# Patient Record
Sex: FEMALE | Race: WHITE | NOT HISPANIC OR LATINO | URBAN - METROPOLITAN AREA
[De-identification: names, ages, dates, MRNs, and addresses within clinical notes are randomized per-mention and may not be internally consistent; named-entity substitution may affect disease eponyms.]

---

## 2017-04-28 ENCOUNTER — HOSPITAL ENCOUNTER (OUTPATIENT)
Dept: GENERAL RADIOLOGY | Facility: HOSPITAL | Age: 72
Discharge: HOME | End: 2017-04-28
Attending: INTERNAL MEDICINE | Admitting: INTERNAL MEDICINE

## 2017-04-28 ENCOUNTER — CONVERSION ENCOUNTER (OUTPATIENT)
Dept: RHEUMATOLOGY | Facility: CLINIC | Age: 72
End: 2017-04-28

## 2017-04-28 LAB
ALBUMIN SERPL-MCNC: 4.3 G/DL (ref 3.6–5.1)
ALBUMIN/GLOB SERPL: ABNORMAL {RATIO} (ref 1–2.5)
ALP SERPL-CCNC: 74 UNITS/L (ref 33–115)
ALT SERPL-CCNC: 17 UNITS/L (ref 6–29)
ANA SER QL IA: NEGATIVE
AST SERPL-CCNC: 21 UNITS/L (ref 10–35)
BILIRUB SERPL-MCNC: 0.3 MG/DL (ref 0.2–1.2)
BUN SERPL-MCNC: 27 MG/DL (ref 7–25)
BUN/CREAT SERPL: ABNORMAL (ref 6–22)
CALCIUM SERPL-MCNC: 9.7 MG/DL (ref 8.6–10.2)
CHLORIDE SERPL-SCNC: 92 MMOL/L (ref 98–110)
CK SERPL-CCNC: 197 UNITS/L (ref 29–143)
CO2 CONTENT VENOUS: 28 MMOL/L (ref 20–31)
CONV RF TITER: 8
CONV TOTAL PROTEIN: 7.5 G/DL (ref 6.1–8.1)
CREAT UR-MCNC: 1.65 MG/DL (ref 0.5–1.1)
CRP SERPL-MCNC: 1.57 MG/DL
CYCLIC CITRULLINATED PEPTIDE ANTIBODY: ABNORMAL
ERYTHROCYTE [DISTWIDTH] IN BLOOD BY AUTOMATED COUNT: 15 % (ref 11–15)
ERYTHROCYTE [SEDIMENTATION RATE] IN BLOOD BY WESTERGREN METHOD: 17 MM/HR
GLOBULIN UR ELPH-MCNC: ABNORMAL G/DL (ref 1.9–3.7)
GLUCOSE SERPL-MCNC: 147 MG/DL (ref 65–99)
HCT VFR BLD AUTO: 40.5 % (ref 35–45)
HGB BLD-MCNC: 13.2 G/DL (ref 11.7–15.5)
MCH RBC QN AUTO: 30.5 PG (ref 27–33)
MCHC RBC AUTO-ENTMCNC: ABNORMAL % (ref 32–36)
MCV RBC AUTO: 93.5 FL (ref 80–100)
PLATELET # BLD AUTO: ABNORMAL 10*3/MM3 (ref 140–400)
PMV BLD AUTO: 7.8 FL (ref 7.5–12.5)
POTASSIUM SERPL-SCNC: 4.6 MMOL/L (ref 3.5–5.3)
RBC # BLD AUTO: ABNORMAL 10*6/MM3 (ref 3.8–5.1)
SODIUM SERPL-SCNC: 131 MMOL/L (ref 135–146)
TSH SERPL-ACNC: 4.96 MICROINTL UNITS/ML
WBC # BLD AUTO: ABNORMAL K/UL (ref 3.8–10.8)

## 2017-06-08 ENCOUNTER — HOSPITAL ENCOUNTER (OUTPATIENT)
Dept: MAMMOGRAPHY | Facility: HOSPITAL | Age: 72
Discharge: HOME | End: 2017-06-08
Attending: INTERNAL MEDICINE | Admitting: INTERNAL MEDICINE

## 2018-11-13 ENCOUNTER — HOSPITAL ENCOUNTER (OUTPATIENT)
Dept: LAB | Facility: HOSPITAL | Age: 73
Discharge: HOME | End: 2018-11-13
Attending: INTERNAL MEDICINE | Admitting: INTERNAL MEDICINE

## 2018-11-13 LAB
ALBUMIN SERPL-MCNC: 3.7 G/DL (ref 3.5–4.8)
ALBUMIN/GLOB SERPL: 1.2 {RATIO} (ref 1–1.7)
ALP SERPL-CCNC: 62 IU/L (ref 32–91)
ALT SERPL-CCNC: 16 IU/L (ref 14–54)
ANION GAP SERPL CALC-SCNC: 12.8 MMOL/L (ref 10–20)
AST SERPL-CCNC: 25 IU/L (ref 15–41)
BASOPHILS # BLD AUTO: 0 10*3/UL (ref 0–0.2)
BASOPHILS NFR BLD AUTO: 1 % (ref 0–2)
BILIRUB SERPL-MCNC: 0.4 MG/DL (ref 0.3–1.2)
BUN SERPL-MCNC: 16 MG/DL (ref 8–20)
BUN/CREAT SERPL: 16 (ref 5.4–26.2)
CALCIUM SERPL-MCNC: 8.7 MG/DL (ref 8.9–10.3)
CHLORIDE SERPL-SCNC: 100 MMOL/L (ref 101–111)
CK SERPL-CCNC: 90 IU/L (ref 38–234)
CONV CO2: 29 MMOL/L (ref 22–32)
CONV TOTAL PROTEIN: 6.8 G/DL (ref 6.1–7.9)
CREAT UR-MCNC: 1 MG/DL (ref 0.4–1)
CRP SERPL-MCNC: 0.7 MG/DL (ref 0–0.7)
DIFFERENTIAL METHOD BLD: (no result)
EOSINOPHIL # BLD AUTO: 0.1 10*3/UL (ref 0–0.3)
EOSINOPHIL # BLD AUTO: 2 % (ref 0–3)
ERYTHROCYTE [DISTWIDTH] IN BLOOD BY AUTOMATED COUNT: 14 % (ref 11.5–14.5)
ERYTHROCYTE [SEDIMENTATION RATE] IN BLOOD BY WESTERGREN METHOD: 21 MM/HR (ref 0–30)
GLOBULIN UR ELPH-MCNC: 3.1 G/DL (ref 2.5–3.8)
GLUCOSE SERPL-MCNC: 75 MG/DL (ref 65–99)
HCT VFR BLD AUTO: 38.1 % (ref 35–49)
HGB BLD-MCNC: 12.9 G/DL (ref 12–15)
LYMPHOCYTES # BLD AUTO: 2 10*3/UL (ref 0.8–4.8)
LYMPHOCYTES NFR BLD AUTO: 38 % (ref 18–42)
MCH RBC QN AUTO: 31.2 PG (ref 26–32)
MCHC RBC AUTO-ENTMCNC: 33.9 G/DL (ref 32–36)
MCV RBC AUTO: 92.1 FL (ref 80–94)
MONOCYTES # BLD AUTO: 0.3 10*3/UL (ref 0.1–1.3)
MONOCYTES NFR BLD AUTO: 7 % (ref 2–11)
NEUTROPHILS # BLD AUTO: 2.9 10*3/UL (ref 2.3–8.6)
NEUTROPHILS NFR BLD AUTO: 52 % (ref 50–75)
NRBC BLD AUTO-RTO: 0 /100{WBCS}
NRBC/RBC NFR BLD MANUAL: 0 10*3/UL
PLATELET # BLD AUTO: 301 10*3/UL (ref 150–450)
PMV BLD AUTO: 7.7 FL (ref 7.4–10.4)
POTASSIUM SERPL-SCNC: 3.8 MMOL/L (ref 3.6–5.1)
RBC # BLD AUTO: 4.13 10*6/UL (ref 4–5.4)
SODIUM SERPL-SCNC: 138 MMOL/L (ref 136–144)
WBC # BLD AUTO: 5.4 10*3/UL (ref 4.5–11.5)

## 2019-02-18 ENCOUNTER — HOSPITAL ENCOUNTER (OUTPATIENT)
Dept: LAB | Facility: HOSPITAL | Age: 74
Discharge: HOME | End: 2019-02-18
Attending: NURSE PRACTITIONER | Admitting: NURSE PRACTITIONER

## 2019-02-18 LAB
ALBUMIN SERPL-MCNC: 3.6 G/DL (ref 3.5–4.8)
ALBUMIN SERPL-MCNC: 3.8 G/DL (ref 3.5–4.8)
ALBUMIN/GLOB SERPL: 1.2 {RATIO} (ref 1–1.7)
ALP SERPL-CCNC: 54 IU/L (ref 32–91)
ALPHA1 GLOB FLD ELPH-MCNC: 0.3 GM/DL (ref 0.1–0.4)
ALPHA2 GLOB SERPL ELPH-MCNC: 1 GM/DL (ref 0.5–1)
ALT SERPL-CCNC: 22 IU/L (ref 14–54)
ANION GAP SERPL CALC-SCNC: 17.9 MMOL/L (ref 10–20)
AST SERPL-CCNC: 29 IU/L (ref 15–41)
B-GLOBULIN SERPL ELPH-MCNC: 1 GM/DL (ref 0.7–1.4)
BACTERIA ISLT: ABNORMAL
BACTERIA SPEC AEROBE CULT: ABNORMAL
BILIRUB SERPL-MCNC: 0.5 MG/DL (ref 0.3–1.2)
BILIRUB UR QL STRIP: NEGATIVE MG/DL
BUN SERPL-MCNC: 27 MG/DL (ref 8–20)
BUN/CREAT SERPL: 15.9 (ref 5.4–26.2)
CALCIUM SERPL-MCNC: 8.7 MG/DL (ref 8.9–10.3)
CASTS URNS QL MICRO: ABNORMAL /[LPF]
CHLORIDE SERPL-SCNC: 90 MMOL/L (ref 101–111)
COLONY COUNT: ABNORMAL
COLOR UR: YELLOW
CONV BACTERIA IN URINE MICRO: ABNORMAL
CONV CLARITY OF URINE: ABNORMAL
CONV CO2: 25 MMOL/L (ref 22–32)
CONV HYALINE CASTS IN URINE MICRO: ABNORMAL /[LPF] (ref 0–5)
CONV PROTEIN IN URINE BY AUTOMATED TEST STRIP: NEGATIVE MG/DL
CONV SMALL ROUND CELLS: ABNORMAL /[HPF]
CONV TOTAL PROTEIN: 6.9 G/DL (ref 6.1–7.9)
CONV TOTAL PROTEIN: 7.1 G/DL (ref 6.1–7.9)
CONV UROBILINOGEN IN URINE BY AUTOMATED TEST STRIP: 0.2 MG/DL
CREAT UR-MCNC: 1.7 MG/DL (ref 0.4–1)
CRP SERPL-MCNC: 0.9 MG/DL (ref 0–0.7)
CULTURE INDICATED?: ABNORMAL
ERYTHROCYTE [DISTWIDTH] IN BLOOD BY AUTOMATED COUNT: 16.4 % (ref 11.5–14.5)
GAMMA GLOB SERPL ELPH-MCNC: 1.1 GM/DL (ref 0.6–1.6)
GLOBULIN UR ELPH-MCNC: 3.3 G/DL (ref 2.5–3.8)
GLUCOSE SERPL-MCNC: 350 MG/DL (ref 65–99)
GLUCOSE UR QL: >1000 MG/DL
HCT VFR BLD AUTO: 37.9 % (ref 35–49)
HGB BLD-MCNC: 12.7 G/DL (ref 12–15)
HGB UR QL STRIP: NEGATIVE
INSULIN SERPL-ACNC: NORMAL U[IU]/ML
KETONES UR QL STRIP: NEGATIVE MG/DL
LEUKOCYTE ESTERASE UR QL STRIP: ABNORMAL
LEVOFLOXACIN SUSC ISLT: ABNORMAL
Lab: ABNORMAL
MCH RBC QN AUTO: 31.3 PG (ref 26–32)
MCHC RBC AUTO-ENTMCNC: 33.6 G/DL (ref 32–36)
MCV RBC AUTO: 93.1 FL (ref 80–94)
MEROPENEM SUSC ISLT: ABNORMAL
MICRO REPORT STATUS: ABNORMAL
NITRITE UR QL STRIP: NEGATIVE
NITROFURANTOIN SUSC ISLT: ABNORMAL
PH UR STRIP.AUTO: 5.5 [PH] (ref 4.5–8)
PLATELET # BLD AUTO: 285 10*3/UL (ref 150–450)
PMV BLD AUTO: 7.8 FL (ref 7.4–10.4)
POTASSIUM SERPL-SCNC: 3.9 MMOL/L (ref 3.6–5.1)
RBC # BLD AUTO: 4.07 10*6/UL (ref 4–5.4)
RBC #/AREA URNS HPF: NEGATIVE /[HPF] (ref 0–3)
SODIUM SERPL-SCNC: 129 MMOL/L (ref 136–144)
SP GR UR: 1.01 (ref 1–1.03)
SPECIMEN SOURCE: ABNORMAL
SPERM URNS QL MICRO: ABNORMAL /[HPF]
SQUAMOUS SPT QL MICRO: ABNORMAL /[HPF] (ref 0–5)
SUSC METH SPEC: ABNORMAL
TIGECYCLINE ISLT MIC: ABNORMAL
TOBRAMYCIN SUSC ISLT: ABNORMAL
TRIMETHOPRIM/SULFA: ABNORMAL
UNIDENT CRYS URNS QL MICRO: ABNORMAL /[HPF]
WBC # BLD AUTO: 5.9 10*3/UL (ref 4.5–11.5)
WBC #/AREA URNS HPF: 5 /[HPF] (ref 0–5)
YEAST SPEC QL WET PREP: ABNORMAL /[HPF]

## 2019-02-19 LAB — CCP IGG ANTIBODIES: <0.4 U/ML

## 2019-02-20 LAB
C3 SERPL-MCNC: 130 MG/DL (ref 79–152)
C4 SERPL-MCNC: 27.5 MG/DL (ref 18–55)

## 2019-05-22 ENCOUNTER — CONVERSION ENCOUNTER (OUTPATIENT)
Dept: RHEUMATOLOGY | Facility: CLINIC | Age: 74
End: 2019-05-22

## 2019-06-04 VITALS
SYSTOLIC BLOOD PRESSURE: 106 MMHG | HEART RATE: 75 BPM | WEIGHT: 126 LBS | BODY MASS INDEX: 23.79 KG/M2 | DIASTOLIC BLOOD PRESSURE: 64 MMHG | HEIGHT: 61 IN

## 2019-06-06 NOTE — PROGRESS NOTES
Visit Type:  Follow-up Visit  Referring Provider:  Eleni Lam NP  Primary Provider:  Nelia Albert MD    CC:  joint pain.    History of Present Illness:  Pt is a 73 y.o, female with history of +OSORIO, sjogrens, COPD, osteopenia, CKD  Last seen in February 2019 and her last prolia injection was given on 11-20-19. Labs the end of April showed normal calcium & vitamin D.     SHe is taking plaquenil 200mg once daily. her last eye exam was in November 2018 and was ok for the plaquenil. She is taking hydrocodone TID (followed by pain management).  Meds do help some. Stiffness lasts a hour or two per day.Her low back bothers her   the most. She does see pain management and has history of lumbar spine surgery in the past. Claims that hydrocodone and gabapentin do help. She has neuropathy of the LEs. Denies fever/chills. Has dry eyes and uses gtts.  NO sores in the mouth or nose.   Intermitent chest pain and SOA. She is followed by cardiologist for her history of CAD. Follows pulmnologist for her shortness and breath.CLaims she recently had PFT.   Denies skin rashes or psoriasis. No LESLIE, jaw pain or blurred vision. She follows   urologist for her frequent UTIs (Pecka). Had urine checked 3 weeks ago & there was no infection.  The remainder of the review of systems reviewed and are (-).     Last dexa scan was on 6/2017 and showed osteopenia with high risk of fracture. She takes prolia and last injection was ok 11-20-19 & is due today. She denies any known infection, no planned dental work, no malignancy.        Past Medical History:     Reviewed history from 04/26/2017 and no changes required:        C O P D        Diabetes, Type 2        Hyperkalemia        Angina        Cardiac arrhythmias         CAD        Heart failure        HTN        Kidney Disease        Rheumatoid Arthritis        Fractures (left arm)        tumor in intestinal tract        brain tumor        Depression vision deficit        peripheral  neuropathy        seizures    Past Surgical History:     Reviewed history from 04/26/2017 and no changes required:        Cataract extraction        brain tumor        C A B G:        Cholecystectomy        tumor in intestines        hysterectomy        Oophorectomy        Carpal Tunnel         Back Surgery         right leg X3        Social History:     Reviewed history from 02/18/2019 and no changes required:        Marital Status:         Children: 2        Occupation:         Alcohol Use - no                Smoking History:        Patient has never smoked.        Risk Factors:     Smoked Tobacco Use:  Never smoker  Alcohol use:  no    Previous Tobacco Use: Signed On - 02/18/2019  Smoked Tobacco Use:  Never smoker    Previous Alcohol Use: Signed On - 02/18/2019  Alcohol use:  no    Active Medications:  ACCU-CHEK FASTCLIX LANCETS (LANCETS) TEST BLOOD SUGAR TID  OMEPRAZOLE 20 MG ORAL CAPSULE DELAYED RELEASE (OMEPRAZOLE) TK ONE C PO BID  CLONIDINE HCL 0.1 MG ORAL TABLET (CLONIDINE HCL) TK 1 T PO TID PRN FO HIGH BLOOD PRESSURE IF HIGHER THAN 160  BENZONATATE 200 MG ORAL CAPSULE (BENZONATATE) TK 1 C PO TID PRF COUGH  BD PEN NEEDLE ALHAJI U/F 32G X 4 MM (INSULIN PEN NEEDLE)   ATORVASTATIN CALCIUM 40 MG ORAL TABLET (ATORVASTATIN CALCIUM) TK 1 T PO QD  PROLIA 60 MG/ML SUBCUTANEOUS SOLUTION (DENOSUMAB) Every 6 months and 1 day  OXYCODONE HCL 10 MG ORAL TABLET (OXYCODONE HCL) take 1 tablet as needed every 4 hours for pain  NITROGLYCERIN 0.4 MG SUBLINGUAL TABLET SUBLINGUAL (NITROGLYCERIN)   TIZANIDINE HCL 4 MG ORAL TABLET (TIZANIDINE HCL) 1 tablet prn every 8 hours  ALBUTEROL SULFATE (2.5 MG/3ML) 0.083% INHALATION NEBULIZATION SOLUTION (ALBUTEROL SULFATE)   MAGNESIUM CITRATE 100 MG ORAL TABLET (MAGNESIUM CITRATE) Take 1 tablet by mouth daily  NORCO  MG ORAL TABLET (HYDROCODONE-ACETAMINOPHEN) 1 tab po tid prn  MECLIZINE HCL 25 MG ORAL TABLET (MECLIZINE HCL) Take 1 tablet by mouth daily  DEPAKOTE 125 MG ORAL  TABLET DELAYED RELEASE (DIVALPROEX SODIUM) Take 1 tablet by mouth daily at bedtime  CYCLOBENZAPRINE HCL 10 MG ORAL TABLET (CYCLOBENZAPRINE HCL) Take 1 tablet by mouth daily at bedtime  VITAMIN D 2000 UNIT ORAL CAPSULE (CHOLECALCIFEROL) Take 1 tablet by mouth daily  DETROL LA 4 MG ORAL CAPSULE EXTENDED RELEASE 24 HOUR (TOLTERODINE TARTRATE) Take 1 tablet by mouth daily  PREDNISONE 50 MG ORAL TABLET (PREDNISONE) Take 1 tablet by mouth daily  MUCINEX 600 MG ORAL TABLET EXTENDED RELEASE 12 HOUR (GUAIFENESIN)   CLOPIDOGREL BISULFATE 75 MG ORAL TABLET (CLOPIDOGREL BISULFATE)   RESTASIS 0.05 % OPHTHALMIC EMULSION (CYCLOSPORINE) 1 into affected eye twice a day  GABAPENTIN 600 MG ORAL TABLET (GABAPENTIN) 1 am 2 tabs at bedtime  LANTUS SOLOSTAR 100 UNIT/ML SUBCUTANEOUS SOLUTION PEN-INJECTOR (INSULIN GLARGINE) 20 units qhs  DULOXETINE HCL 60 MG ORAL CAPSULE DELAYED RELEASE PARTICLES (DULOXETINE HCL) Take 1 tablet by mouth daily  LEVOTHYROXINE SODIUM 150 MCG ORAL TABLET (LEVOTHYROXINE SODIUM) Take 1 tablet by mouth daily  BACLOFEN 10 MG ORAL TABLET (BACLOFEN)   HYDROXYCHLOROQUINE 200MG TABLETS (HYDROXYCHLOROQUINE SULFATE) TAKE 1 TABLET BY MOUTH DAILY AFTER SUPPER  ESTRADIOL 0.5 MG ORAL TABLET (ESTRADIOL) TK 1 T PO QD  DIVALPROEX SODIUM 125 MG ORAL TABLET DELAYED RELEASE (DIVALPROEX SODIUM) TK 1 T PO QHS  FLUDROCORTISONE ACETATE 0.1 MG ORAL TABLET (FLUDROCORTISONE ACETATE) TK 1 T PO QAM    Current Allergies:  * CITRIC ACID (Critical)  * ANESTHETICS (Critical)  IODINE (Critical)  * OXYCODONE (Critical)  * PROPOFOL (Critical)      Review of Systems        See HPI      Vital Signs:    Patient Profile:    73 Years Old Female  Height:     61 inches (154.94 cm)  Weight:     126 pounds  BMI:        23.80     BSA:        1.55  Pulse rate: 75 / minute  BP Sittin / 64  (left arm)    Cuff size:  large          Vitals Entered By: Susi Law MA (May 22, 2019 12:13 PM)      Physical Exam    General:       overnourished white female;  no acute distress.   Head:      normocephalic and atraumatic.    Eyes:      PERRL/EOM intact, conjunctiva and sclera clear with out nystagmus.    Nose:      no deformity, discharge, inflammation, or lesions.    Mouth:      Dry mouth  Lungs:      diminished lung sounds are noted to the anterior & posterior lung bases.   Heart:      non-displaced PMI, chest non-tender; regular rate and rhythm, S1, S2 without murmurs, rubs, or gallops  Msk:      slight decreased  strength is noted bilaterally  Pulses:      pulses normal in all 4 extremities.    Neurologic:      decreased reflexes:.    Skin:        He has dry skin    Diabetes Management Exam:      Foot Exam (with socks and/or shoes not present):        Pulses:           pulses normal in all 4 extremities.        Blood Pressure:  Today's BP: 106/64 mm Hg        ROM: She has decreased ROM of the lumbar spine and william hips  Tender Joint: she has tenderness over the lumbar spine, mild tenderness over the knees and ankles  Swollen Joint: no swelling is noted on examination      Impression & Recommendations:    Problem # 1:  Inflammatory arthritis (ICD-714.9) (OEA74-L28.80)  Assessment: Comment Only  Recent labs reviewed: normal vitamin  D & calcium level, creatinine mildly elevated  She has no active synovitis on exmaination.  She is taking plaquenil- pt to continue  Discussed the importance of eye examination with plaquenil use. Pt verbalizes understanding  RTO in 4 months or sooner as needed.      Cancer screening:  Colonoscopy: no PAP: years Mammogram: years   Bone health: calcium and vitamin D: yes, DXA scan:  yes 6/2017  Hep C:  Vaccines: Flu: 09/2018 PNA: 2016 Zoster: 2017   X-rays of the chest:  Hands:  Feet:   Hepatitis panel, HIV, QTB/PPD:        Last eye exam: 2017       Problem # 2:  Osteopenia with high fracture risk (ICD-733.90) (XGT26-I27.80)  Assessment: Comment Only  Pt is due for prolia today, labs reviewed---> injection given  Her updated medication list  for this problem includes:     Prolia 60 Mg/ml Subcutaneous Solution (Denosumab) ..... Every 6 months and 1 day     Vitamin D 2000 Unit Oral Capsule (Cholecalciferol) ..... Take 1 tablet by mouth daily    Orders:  Administration of Injections  (77552)  Prolia 1 mg ()    Orders:  Administration of Injections  (13113)  Prolia 1 mg ()      Problem # 3:  Chronic kidney disease stage 3 (ICD-585.3) (DHO72-Y58.3)  Assessment: Comment Only  She is under the care of nephrologist    Problem # 4:  Spinal stenosis, lumbar (ICD-724.02) (RGU81-A59.06)  Assessment: Comment Only  She is under the care of pain management    Medications Added to Medication List This Visit:  1)  Trazodone Hcl 100 Mg Oral Tablet (Trazodone hcl) .... Tk 1 to 2 ts po qhs  2)  Amlodipine Besylate 5 Mg Oral Tablet (Amlodipine besylate) .... Tk 1 t po qd  3)  Briviact 50 Mg Oral Tablet (Brivaracetam) .... Tk 1 t po bid  4)  Oxybutynin Chloride Er 10 Mg Oral Tablet Extended Release 24 Hour (Oxybutynin chloride)      Patient Instructions:  1)  Labs reviewed & ok. Pt to have prolia  2)  Continue w/ plaquenil  3)  Discussed the importance of eye examination with plaquenil use. Pt verbalizes understanding  4)  RTO in 4 months or sooner if needed.                      Medication Administration    Injection # 1:     Medication: Prolia      Diagnosis: Osteoporosis (ICD-733.00) (DAK96-Z75.0)     Route: SQ     Site: L arm     Exp Date: 07/2021     Lot #: [id number]     Mfr: [email or web address]     Given by: Susi Law MA     Date Given:05/22/2019 1244     Comments: Injection site left arm: no injection site reaction is noted.  Cardiopulmonary exam: normal respiratory effort, +S1S2, lungs clear.   Pt tolerated well.      Tolerated w/o complications    Orders Added:  1)  [id number] Vst-Est Level III [CPT-19135]  2)  Administration of Injections  [84289]  3)  Prolia 1 mg []  ]      Electronically signed by Rebeka WHITTAKER on 05/22/2019 at 1:04  PM  ________________________________________________________________________       Disclaimer: Converted Note message may not contain all data elements that existed in the legacy source system. Please see [facility] for the original note details.

## 2019-07-22 RX ORDER — HYDROXYCHLOROQUINE SULFATE 200 MG/1
TABLET, FILM COATED ORAL
Qty: 90 TABLET | Refills: 0 | Status: SHIPPED | OUTPATIENT
Start: 2019-07-22 | End: 2019-10-29 | Stop reason: SDUPTHER

## 2019-07-30 RX ORDER — HYDROXYCHLOROQUINE SULFATE 200 MG/1
TABLET, FILM COATED ORAL
Qty: 90 TABLET | Refills: 0 | OUTPATIENT
Start: 2019-07-30

## 2019-09-25 PROBLEM — M48.061 SPINAL STENOSIS, LUMBAR: Status: ACTIVE | Noted: 2017-04-26

## 2019-09-25 PROBLEM — M17.9 OSTEOARTHRITIS OF KNEE: Status: ACTIVE | Noted: 2017-04-26

## 2019-09-25 PROBLEM — M81.0 OSTEOPOROSIS: Status: ACTIVE | Noted: 2018-11-20

## 2019-09-25 PROBLEM — E11.9 TYPE 2 DIABETES MELLITUS (HCC): Status: ACTIVE | Noted: 2017-04-26

## 2019-09-25 PROBLEM — M19.90 INFLAMMATORY ARTHRITIS: Status: ACTIVE | Noted: 2017-05-25

## 2019-09-25 PROBLEM — N18.30 CHRONIC RENAL INSUFFICIENCY, STAGE III (MODERATE) (HCC): Status: ACTIVE | Noted: 2017-04-26

## 2019-09-25 PROBLEM — M54.50 LOWER BACK PAIN: Status: ACTIVE | Noted: 2018-11-13

## 2019-09-25 PROBLEM — M85.80 OSTEOPENIA: Status: ACTIVE | Noted: 2018-02-14

## 2019-09-25 PROBLEM — J44.9 CHRONIC OBSTRUCTIVE PULMONARY DISEASE (HCC): Status: ACTIVE | Noted: 2017-04-26

## 2019-10-29 RX ORDER — HYDROXYCHLOROQUINE SULFATE 200 MG/1
TABLET, FILM COATED ORAL
Qty: 90 TABLET | Refills: 0 | Status: SHIPPED | OUTPATIENT
Start: 2019-10-29

## 2019-11-20 ENCOUNTER — TELEPHONE (OUTPATIENT)
Dept: RHEUMATOLOGY | Facility: CLINIC | Age: 74
End: 2019-11-20

## 2019-11-20 DIAGNOSIS — M81.0 OSTEOPOROSIS, UNSPECIFIED OSTEOPOROSIS TYPE, UNSPECIFIED PATHOLOGICAL FRACTURE PRESENCE: Primary | ICD-10-CM

## 2019-11-20 DIAGNOSIS — M06.4 INFLAMMATORY POLYARTHROPATHY (HCC): ICD-10-CM

## 2019-11-21 ENCOUNTER — TELEPHONE (OUTPATIENT)
Dept: RHEUMATOLOGY | Facility: CLINIC | Age: 74
End: 2019-11-21

## 2019-11-21 NOTE — TELEPHONE ENCOUNTER
[facility] [place] [facility] Her nephew stated that she had a significant medical emergency last week and he had to take her to [facility]. She was then taken to [facility] and had a mental emergency and is [facility] (short term inpatient psychiatric care). She will not be making her appointment next week and nephew will call if she needs to come back here. But more than likely she will not be back to our office for a while.